# Patient Record
Sex: FEMALE
[De-identification: names, ages, dates, MRNs, and addresses within clinical notes are randomized per-mention and may not be internally consistent; named-entity substitution may affect disease eponyms.]

---

## 2020-10-29 PROBLEM — E66.01 CLASS 3 SEVERE OBESITY DUE TO EXCESS CALORIES WITHOUT SERIOUS COMORBIDITY WITH BODY MASS INDEX (BMI) OF 50.0 TO 59.9 IN ADULT (HCC): Status: ACTIVE | Noted: 2020-10-29

## 2020-10-29 PROBLEM — E66.813 CLASS 3 SEVERE OBESITY DUE TO EXCESS CALORIES WITHOUT SERIOUS COMORBIDITY WITH BODY MASS INDEX (BMI) OF 50.0 TO 59.9 IN ADULT (HCC): Status: ACTIVE | Noted: 2020-10-29

## 2020-11-09 ENCOUNTER — TELEPHONE (OUTPATIENT)
Dept: SCHEDULING | Age: 50
End: 2020-11-09

## 2020-12-07 ENCOUNTER — OFFICE VISIT (OUTPATIENT)
Dept: ENDOCRINOLOGY | Age: 50
End: 2020-12-07

## 2020-12-07 VITALS
RESPIRATION RATE: 20 BRPM | DIASTOLIC BLOOD PRESSURE: 88 MMHG | HEIGHT: 63 IN | WEIGHT: 293 LBS | BODY MASS INDEX: 51.91 KG/M2 | SYSTOLIC BLOOD PRESSURE: 136 MMHG | TEMPERATURE: 97.8 F | HEART RATE: 86 BPM

## 2020-12-07 DIAGNOSIS — E55.9 VITAMIN D DEFICIENCY: ICD-10-CM

## 2020-12-07 DIAGNOSIS — E11.69 HYPERLIPIDEMIA ASSOCIATED WITH TYPE 2 DIABETES MELLITUS (CMD): ICD-10-CM

## 2020-12-07 DIAGNOSIS — E78.5 HYPERLIPIDEMIA ASSOCIATED WITH TYPE 2 DIABETES MELLITUS (CMD): ICD-10-CM

## 2020-12-07 DIAGNOSIS — E11.65 TYPE 2 DIABETES MELLITUS WITH HYPERGLYCEMIA, WITHOUT LONG-TERM CURRENT USE OF INSULIN (CMD): Primary | ICD-10-CM

## 2020-12-07 PROCEDURE — 3075F SYST BP GE 130 - 139MM HG: CPT | Performed by: INTERNAL MEDICINE

## 2020-12-07 PROCEDURE — 3079F DIAST BP 80-89 MM HG: CPT | Performed by: INTERNAL MEDICINE

## 2020-12-07 PROCEDURE — 99244 OFF/OP CNSLTJ NEW/EST MOD 40: CPT | Performed by: INTERNAL MEDICINE

## 2020-12-07 RX ORDER — VITAMIN B COMPLEX
25 TABLET ORAL 2 TIMES DAILY
COMMUNITY

## 2020-12-07 RX ORDER — METFORMIN HYDROCHLORIDE 500 MG/1
500 TABLET, EXTENDED RELEASE ORAL 2 TIMES DAILY WITH MEALS
Qty: 60 TABLET | Refills: 3 | Status: SHIPPED | OUTPATIENT
Start: 2020-12-07

## 2020-12-07 RX ORDER — GLIMEPIRIDE 2 MG/1
2 TABLET ORAL
Qty: 30 TABLET | Refills: 3 | Status: SHIPPED | OUTPATIENT
Start: 2020-12-07 | End: 2021-03-31

## 2020-12-07 RX ORDER — ATORVASTATIN CALCIUM 20 MG/1
20 TABLET, FILM COATED ORAL DAILY
Qty: 30 TABLET | Refills: 3 | Status: SHIPPED | OUTPATIENT
Start: 2020-12-07 | End: 2021-03-31

## 2020-12-07 ASSESSMENT — PATIENT HEALTH QUESTIONNAIRE - PHQ9
CLINICAL INTERPRETATION OF PHQ2 SCORE: NO FURTHER SCREENING NEEDED
2. FEELING DOWN, DEPRESSED OR HOPELESS: NOT AT ALL
CLINICAL INTERPRETATION OF PHQ9 SCORE: NO FURTHER SCREENING NEEDED
SUM OF ALL RESPONSES TO PHQ9 QUESTIONS 1 AND 2: 0
SUM OF ALL RESPONSES TO PHQ9 QUESTIONS 1 AND 2: 0
1. LITTLE INTEREST OR PLEASURE IN DOING THINGS: NOT AT ALL

## 2020-12-10 ENCOUNTER — TELEPHONE (OUTPATIENT)
Dept: ENDOCRINOLOGY | Age: 50
End: 2020-12-10

## 2021-01-04 ENCOUNTER — APPOINTMENT (OUTPATIENT)
Dept: ENDOCRINOLOGY | Age: 51
End: 2021-01-04

## 2021-01-07 ENCOUNTER — OFFICE VISIT (OUTPATIENT)
Dept: EDUCATION SERVICES | Age: 51
End: 2021-01-07

## 2021-01-07 DIAGNOSIS — E11.65 TYPE 2 DIABETES MELLITUS WITH HYPERGLYCEMIA, WITHOUT LONG-TERM CURRENT USE OF INSULIN (CMD): Primary | ICD-10-CM

## 2021-01-07 PROCEDURE — 97802 MEDICAL NUTRITION INDIV IN: CPT | Performed by: DIETITIAN, REGISTERED

## 2021-01-14 ENCOUNTER — TELEPHONE (OUTPATIENT)
Dept: SCHEDULING | Age: 51
End: 2021-01-14

## 2021-01-14 ENCOUNTER — V-VISIT (OUTPATIENT)
Dept: ENDOCRINOLOGY | Age: 51
End: 2021-01-14

## 2021-01-14 PROCEDURE — X1094 NO CHARGE VISIT: HCPCS | Performed by: INTERNAL MEDICINE

## 2021-01-14 RX ORDER — PERPHENAZINE 16 MG/1
TABLET, FILM COATED ORAL
COMMUNITY
Start: 2020-12-26

## 2021-01-14 RX ORDER — LANCETS
EACH MISCELLANEOUS
COMMUNITY
Start: 2020-12-07

## 2021-01-29 ENCOUNTER — TELEPHONE (OUTPATIENT)
Dept: FAMILY MEDICINE | Age: 51
End: 2021-01-29

## 2021-01-30 ENCOUNTER — TELEPHONE (OUTPATIENT)
Dept: SCHEDULING | Age: 51
End: 2021-01-30

## 2021-02-01 ENCOUNTER — TELEPHONE (OUTPATIENT)
Dept: SCHEDULING | Age: 51
End: 2021-02-01

## 2021-03-15 ENCOUNTER — APPOINTMENT (OUTPATIENT)
Dept: ENDOCRINOLOGY | Age: 51
End: 2021-03-15

## 2021-03-31 DIAGNOSIS — E78.5 HYPERLIPIDEMIA ASSOCIATED WITH TYPE 2 DIABETES MELLITUS (CMD): ICD-10-CM

## 2021-03-31 DIAGNOSIS — E11.65 TYPE 2 DIABETES MELLITUS WITH HYPERGLYCEMIA, WITHOUT LONG-TERM CURRENT USE OF INSULIN (CMD): ICD-10-CM

## 2021-03-31 DIAGNOSIS — E11.69 HYPERLIPIDEMIA ASSOCIATED WITH TYPE 2 DIABETES MELLITUS (CMD): ICD-10-CM

## 2021-03-31 RX ORDER — ATORVASTATIN CALCIUM 20 MG/1
20 TABLET, FILM COATED ORAL DAILY
Qty: 30 TABLET | Refills: 3 | Status: SHIPPED | OUTPATIENT
Start: 2021-03-31 | End: 2021-08-04 | Stop reason: SDUPTHER

## 2021-03-31 RX ORDER — GLIMEPIRIDE 2 MG/1
TABLET ORAL
Qty: 30 TABLET | Refills: 3 | Status: SHIPPED | OUTPATIENT
Start: 2021-03-31 | End: 2021-08-04 | Stop reason: SDUPTHER

## 2021-05-31 DIAGNOSIS — E11.69 HYPERLIPIDEMIA ASSOCIATED WITH TYPE 2 DIABETES MELLITUS (CMD): ICD-10-CM

## 2021-05-31 DIAGNOSIS — E78.5 HYPERLIPIDEMIA ASSOCIATED WITH TYPE 2 DIABETES MELLITUS (CMD): ICD-10-CM

## 2021-06-01 RX ORDER — ATORVASTATIN CALCIUM 20 MG/1
20 TABLET, FILM COATED ORAL DAILY
Qty: 90 TABLET | Refills: 0 | OUTPATIENT
Start: 2021-06-01

## 2021-08-04 DIAGNOSIS — E78.5 HYPERLIPIDEMIA ASSOCIATED WITH TYPE 2 DIABETES MELLITUS (CMD): ICD-10-CM

## 2021-08-04 DIAGNOSIS — E11.69 HYPERLIPIDEMIA ASSOCIATED WITH TYPE 2 DIABETES MELLITUS (CMD): ICD-10-CM

## 2021-08-04 DIAGNOSIS — E11.65 TYPE 2 DIABETES MELLITUS WITH HYPERGLYCEMIA, WITHOUT LONG-TERM CURRENT USE OF INSULIN (CMD): ICD-10-CM

## 2021-08-04 RX ORDER — ATORVASTATIN CALCIUM 20 MG/1
20 TABLET, FILM COATED ORAL DAILY
Qty: 30 TABLET | Refills: 3 | Status: SHIPPED | OUTPATIENT
Start: 2021-08-04

## 2021-08-04 RX ORDER — GLIMEPIRIDE 2 MG/1
2 TABLET ORAL
Qty: 30 TABLET | Refills: 3 | Status: SHIPPED | OUTPATIENT
Start: 2021-08-04

## 2021-09-15 PROBLEM — E11.65 TYPE 2 DIABETES MELLITUS WITH HYPERGLYCEMIA, WITHOUT LONG-TERM CURRENT USE OF INSULIN (HCC): Status: ACTIVE | Noted: 2020-12-07

## 2021-09-15 PROBLEM — E78.5 HYPERLIPIDEMIA ASSOCIATED WITH TYPE 2 DIABETES MELLITUS (HCC): Status: ACTIVE | Noted: 2020-12-07

## 2021-09-15 PROBLEM — E55.9 VITAMIN D DEFICIENCY: Status: ACTIVE | Noted: 2020-12-07

## 2021-09-15 PROBLEM — E11.69 HYPERLIPIDEMIA ASSOCIATED WITH TYPE 2 DIABETES MELLITUS (HCC): Status: ACTIVE | Noted: 2020-12-07

## 2021-09-17 PROBLEM — Z12.11 SCREENING FOR MALIGNANT NEOPLASM OF COLON: Status: ACTIVE | Noted: 2021-09-17

## 2021-09-17 PROBLEM — Z12.31 VISIT FOR SCREENING MAMMOGRAM: Status: ACTIVE | Noted: 2021-09-17

## 2022-01-24 PROBLEM — E11.9 DIABETES MELLITUS TYPE 2 WITHOUT RETINOPATHY (HCC): Status: ACTIVE | Noted: 2022-01-24

## 2022-01-24 PROBLEM — H52.4 MYOPIA OF BOTH EYES WITH ASTIGMATISM AND PRESBYOPIA: Status: ACTIVE | Noted: 2022-01-24

## 2022-01-24 PROBLEM — H52.203 MYOPIA OF BOTH EYES WITH ASTIGMATISM AND PRESBYOPIA: Status: ACTIVE | Noted: 2022-01-24

## 2022-01-24 PROBLEM — H52.13 MYOPIA OF BOTH EYES WITH ASTIGMATISM AND PRESBYOPIA: Status: ACTIVE | Noted: 2022-01-24

## 2024-12-16 PROBLEM — R92.8 ABNORMAL MAMMOGRAM OF LEFT BREAST: Status: ACTIVE | Noted: 2023-07-07

## 2024-12-16 PROBLEM — D25.9 UTERINE LEIOMYOMA: Status: ACTIVE | Noted: 2022-10-21

## 2024-12-16 PROBLEM — Z91.199 NONCOMPLIANCE: Status: ACTIVE | Noted: 2022-10-21

## 2024-12-16 PROBLEM — N83.201 CYST OF RIGHT OVARY: Status: ACTIVE | Noted: 2022-10-21

## 2024-12-16 RX ORDER — ORAL SEMAGLUTIDE 14 MG/1
7 TABLET ORAL DAILY
COMMUNITY
Start: 2024-09-20 | End: 2024-12-19

## 2024-12-16 RX ORDER — ALBUTEROL SULFATE 90 UG/1
2 INHALANT RESPIRATORY (INHALATION)
COMMUNITY
Start: 2024-07-21

## 2024-12-20 ENCOUNTER — HOSPITAL ENCOUNTER (OUTPATIENT)
Facility: HOSPITAL | Age: 54
Setting detail: HOSPITAL OUTPATIENT SURGERY
Discharge: HOME | End: 2024-12-20
Attending: INTERNAL MEDICINE | Admitting: INTERNAL MEDICINE
Payer: COMMERCIAL

## 2024-12-20 ENCOUNTER — ANESTHESIA (OUTPATIENT)
Dept: ENDOSCOPY | Facility: HOSPITAL | Age: 54
End: 2024-12-20
Payer: COMMERCIAL

## 2024-12-20 ENCOUNTER — ANESTHESIA EVENT (OUTPATIENT)
Dept: ENDOSCOPY | Facility: HOSPITAL | Age: 54
End: 2024-12-20
Payer: COMMERCIAL

## 2024-12-20 VITALS
HEIGHT: 63 IN | DIASTOLIC BLOOD PRESSURE: 75 MMHG | RESPIRATION RATE: 12 BRPM | WEIGHT: 293 LBS | HEART RATE: 83 BPM | BODY MASS INDEX: 51.91 KG/M2 | TEMPERATURE: 97 F | SYSTOLIC BLOOD PRESSURE: 125 MMHG | OXYGEN SATURATION: 97 %

## 2024-12-20 DIAGNOSIS — Z12.11 COLON CANCER SCREENING: ICD-10-CM

## 2024-12-20 LAB — GLUCOSE BLDC GLUCOMTR-MCNC: 115 MG/DL (ref 70–99)

## 2024-12-20 PROCEDURE — 88305 TISSUE EXAM BY PATHOLOGIST: CPT | Performed by: INTERNAL MEDICINE

## 2024-12-20 PROCEDURE — 82962 GLUCOSE BLOOD TEST: CPT

## 2024-12-20 PROCEDURE — 0DBK8ZX EXCISION OF ASCENDING COLON, VIA NATURAL OR ARTIFICIAL OPENING ENDOSCOPIC, DIAGNOSTIC: ICD-10-PCS | Performed by: INTERNAL MEDICINE

## 2024-12-20 RX ORDER — SODIUM CHLORIDE, SODIUM LACTATE, POTASSIUM CHLORIDE, CALCIUM CHLORIDE 600; 310; 30; 20 MG/100ML; MG/100ML; MG/100ML; MG/100ML
INJECTION, SOLUTION INTRAVENOUS CONTINUOUS
Status: DISCONTINUED | OUTPATIENT
Start: 2024-12-20 | End: 2024-12-20

## 2024-12-20 RX ORDER — LIDOCAINE HYDROCHLORIDE 10 MG/ML
INJECTION, SOLUTION EPIDURAL; INFILTRATION; INTRACAUDAL; PERINEURAL AS NEEDED
Status: DISCONTINUED | OUTPATIENT
Start: 2024-12-20 | End: 2024-12-20 | Stop reason: SURG

## 2024-12-20 RX ADMIN — LIDOCAINE HYDROCHLORIDE 50 MG: 10 INJECTION, SOLUTION EPIDURAL; INFILTRATION; INTRACAUDAL; PERINEURAL at 10:38:00

## 2024-12-20 RX ADMIN — SODIUM CHLORIDE, SODIUM LACTATE, POTASSIUM CHLORIDE, CALCIUM CHLORIDE: 600; 310; 30; 20 INJECTION, SOLUTION INTRAVENOUS at 10:34:00

## 2024-12-20 NOTE — DISCHARGE INSTRUCTIONS
POST-COLONOSCOPY DISCHARGE INSTRUCTIONS    PROCEDURE PERFORMED: Colonoscopy with cold forceps biopsy    ENDOSCOPIST: Ellie Díaz MD    FINDINGS: Colon polyp(s) and Internal hemorrhoids    MEDICATIONS: You may resume all other medications today    DIET: You may resume your regular diet today.    BIOPSIES: Biopsies were taken.  They will be sent to pathology and results will be available in 7-10 days.    X-RAYS: No X-rays/Labs were ordered today        Activity for remainder of today:  REST TODAY  DO NOT drive or operate heavy machinery  DO NOT drink any alcoholic beverages  DO NOT sign any legal documents or make any important decisions    After your procedure(s):  It is not unusual to feel bloated or gassy .  Passing gas and belching is encouraged. Lying on your left side with your knees flexed may relieve the discomfort. A hot pack to the abdomen may also help. If you had an upper endoscopy (EGD), you may experience a slight sore throat which will subside. Throat lozenges or salt water gargle can be used.    FOLLOW-UP:  Contact the office at [phone] if a follow-up appointment is needed or if you develop any of the following:    Severe abdominal pain/discomfort   Excessive bleeding  Black tarry stool  Difficulty breathing/swallowing  Persistent nausea/vomiting    Fever above 100 degrees or chills      Home Care Instructions for Colonoscopy  with Sedation    Diet:  - Resume your regular diet as tolerated unless otherwise instructed.  - Start with light meals to minimize bloating.  - Do not drink alcohol today.    Medication:  - If you have questions about resuming your normal medications, please contact your Primary Care Physician.    Activities:  - Take it easy today. Do not return to work today.  - Do not drive today.  - Do not operate any machinery today (including kitchen equipment).    Colonoscopy:  - You may notice some rectal \"spotting\" (a little blood on the toilet tissue) for a day or two after the exam.  This is normal.  - If you experience any rectal bleeding (not spotting), persistent tenderness or sharp severe abdominal pains, oral temperature over 100 degrees Fahrenheit, light-headedness or dizziness, or any other problems, contact your doctor.  **If unable to reach your doctor, please go to the [facility] Emergency Room**    - Your referring physician will receive a full report of your examination.  - If you do not hear from your doctor's office within two weeks of your biopsy, please call them for your results.    You may be able to see your laboratory results in [employer] between 4 and 7 business days.  In some cases, your physician may not have viewed the results before they are released to [employer].  If you have questions regarding your results contact the physician who ordered the test/exam by phone or via [employer] by choosing \"Ask a Medical Question.\"

## 2024-12-20 NOTE — H&P
HISTORY AND PHYSICAL FOR ENDOSCOPIC PROCEDURES      Syl Mariscal Patient Status:  Hospital Outpatient Surgery    1970 MRN [id number]   Location [facility] ENDOSCOPY LAB SUITES Attending Ellie Díaz MD   Hosp Day # 0 PCP No primary care provider on file.     Date of Initial Consult:  today  Reason for Consultation: screening    Scheduled Procedure: Colonoscopy  Indications for Procedure: Screening    History of Present Illness:  Syl Mariscal is a/a(n) 54 year old female who presented with complaints as stated above.    Medical History:  Past Medical History:    Calculus of kidney    Diabetes (HCC)    High blood pressure    High cholesterol     PtHistory reviewed. No pertinent surgical history.   reports that she has never smoked. She has never used smokeless tobacco. She reports current alcohol use. She reports that she does not use drugs.  Family History   Problem Relation Age of Onset    Diabetes Mother     Renal Disease Mother     Diabetes Sister        Allergies:  Allergies[1]    Medications:    Current Facility-Administered Medications:     lactated ringers infusion, , Intravenous, Continuous    Anticoagulants: None    History of Anesthesia Complications: None    Review of Systems:  General: Negative other than what was specified in the HPI  Respiratory: Negative other than what was specified in the HPI  Cardiovascular: Negative other than what was specified in the HPI  Gastrointestinal: Negative other than what was specified in the HPI  Neurological: Negative other than what was specified in the HPI    Physical Exam:  General: AAOx3 in NAD  HEENT: No scleral icterus, no LAD  Lungs: CTA bilaterally, no wheezing or crackles  CV: RRR S1S2, no murmurs or rubs  Abdomen: Normal active bowel sounds, soft, nontender, nondistended, no rebound or guarding  Extremities: No edema or discoloration    Assessment and Plan:  Patient Active Problem List   Diagnosis    Class 3 severe obesity due to excess  calories without serious comorbidity with body mass index (BMI) of 50.0 to 59.9 in adult (HCC)    Type 2 diabetes mellitus with hyperglycemia, without long-term current use of insulin (HCC)    Vitamin D deficiency    Hyperlipidemia associated with type 2 diabetes mellitus (HCC)    Screening for malignant neoplasm of colon    Visit for screening mammogram    Diabetes mellitus type 2 without retinopathy (HCC)    Myopia of both eyes with astigmatism and presbyopia    Abnormal mammogram of left breast    Cyst of right ovary    Noncompliance    Uterine leiomyoma         A. ASA Classification: 3. Patient with severe systemic disease    B.  The sedation plan was explained to the patient.  The risks, benefits and alternatives to the aforementioned endoscopic evaluation(s), including but not limited to: infection, bleeding, aspiration, perforation, adverse medications reaction, missed diagnosis and missed lesions, were explained to the patient and/or family and/or POA and they voiced their understanding and agreed to undergo the procedure(s). They were given the opportunity to ask questions and all inquiries were addressed.    C.  Sedation Plan: Monitored Anesthesia Care      Ellie Díaz MD  12/20/2024  10:32 AM         [1] No Known Allergies

## 2024-12-20 NOTE — ANESTHESIA POSTPROCEDURE EVALUATION
Patient: Syl Mariscal    Procedure Summary       Date: 12/20/24 Room / Location: [facility] ENDOSCOPY 03 / EM ENDOSCOPY    Anesthesia Start: 1034 Anesthesia Stop: 1103    Procedure: COLONOSCOPY Diagnosis:       Colon cancer screening      (polyps,hemorrhoids)    Surgeons: Ellie Díaz MD Anesthesiologist: Desirae Doan CRNA    Anesthesia Type: MAC ASA Status: 3            Anesthesia Type: MAC    Vitals Value Taken Time   /77 12/20/24 1103   Temp 97 °F (36.1 °C) 12/20/24 1103   Pulse 72 12/20/24 1103   Resp 18 12/20/24 1103   SpO2 99 % 12/20/24 1103       [facility] AN Post Evaluation:   Patient Evaluated in PACU  Patient Participation: complete - patient participated  Level of Consciousness: awake and alert  Pain Score: 0  Pain Management: adequate  Airway Patency:patent  Yes    Nausea/Vomiting: none  Cardiovascular Status: acceptable  Respiratory Status: acceptable  Postoperative Hydration acceptable      Desirae Doan CRNA  12/20/2024 11:03 AM

## 2024-12-20 NOTE — ANESTHESIA PREPROCEDURE EVALUATION
Anesthesia PreOp Note    HPI:     Syl Mariscal is a 54 year old female who presents for preoperative consultation requested by: Ellie Díaz MD    Date of Surgery: 12/20/2024    Procedure(s):  COLONOSCOPY  Indication: Colon cancer screening    Relevant Problems   No relevant active problems       NPO:  Last Liquid Consumption Date: 12/20/24  Last Liquid Consumption Time: 0450  Last Solid Consumption Date: 12/18/24  Last Solid Consumption Time: 1230  Last Liquid Consumption Date: 12/20/24          History Review:  Patient Active Problem List    Diagnosis Date Noted    Abnormal mammogram of left breast 07/07/2023    Cyst of right ovary 10/21/2022    Noncompliance 10/21/2022    Uterine leiomyoma 10/21/2022    Diabetes mellitus type 2 without retinopathy (HCC) 01/24/2022    Myopia of both eyes with astigmatism and presbyopia 01/24/2022    Screening for malignant neoplasm of colon 09/17/2021    Visit for screening mammogram 09/17/2021    Type 2 diabetes mellitus with hyperglycemia, without long-term current use of insulin ([facility]) 12/07/2020    Vitamin D deficiency 12/07/2020    Hyperlipidemia associated with type 2 diabetes mellitus ([facility]) 12/07/2020    Class 3 severe obesity due to excess calories without serious comorbidity with body mass index (BMI) of 50.0 to 59.9 in adult ([facility]) 10/29/2020       Past Medical History:    Calculus of kidney    Diabetes ([facility])    High blood pressure    High cholesterol       History reviewed. No pertinent surgical history.    Prescriptions Prior to Admission[1]  Current Medications and Prescriptions Ordered in Epic[2]    Allergies[3]    Family History   Problem Relation Age of Onset    Diabetes Mother     Renal Disease Mother     Diabetes Sister      Social History     Socioeconomic History    Marital status: Unknown   Tobacco Use    Smoking status: Never    Smokeless tobacco: Never   Vaping Use    Vaping status: Never Used   Substance and Sexual Activity    Alcohol use: Yes      Comment: SOCIALLY    Drug use: Never       Available pre-op labs reviewed.     Lab Results   Component Value Date    PGLU 115 (H) 2024          Vital Signs:  Body mass index is 54.38 kg/m².   height is 1.6 m (5' 3\") and weight is 139.3 kg (307 lb) (abnormal). Her blood pressure is 118/87 and her pulse is 98. Her respiration is 20 and oxygen saturation is 96%.   Vitals:    24 1154 24 0950   BP:  118/87   Pulse:  98   Resp:  20   SpO2:  96%   Weight: (!) 139.3 kg (307 lb)    Height: 1.6 m (5' 3\")         Anesthesia Evaluation      Airway   Mallampati: II  TM distance: >3 FB  Neck ROM: full  Dental      Pulmonary     breath sounds clear to auscultation  Cardiovascular   (+) hypertension    Rhythm: regular  Rate: normal    Neuro/Psych      GI/Hepatic/Renal      Endo/Other    (+) diabetes mellitus  Abdominal   (+) obese    Abdomen: soft.                 Anesthesia Plan:   ASA:  3  Plan:   MAC      I have informed Syl Mariscal and/or legal guardian or family member of the nature of the anesthetic plan, benefits, risks including possible dental damage if relevant, major complications, and any alternative forms of anesthetic management.   All of the patient's questions were answered to the best of my ability. The patient desires the anesthetic management as planned.  Desirae Doan CRNA  2024 10:33 AM  Present on Admission:  **None**           [1]   Medications Prior to Admission   Medication Sig Dispense Refill Last Dose/Taking    [] Semaglutide (RYBELSUS) 14 MG Oral Tab Take 7 mg by mouth daily.   2024    atorvastatin 20 MG Oral Tab Take 1 tablet (20 mg total) by mouth nightly. 90 tablet 3 2024    metFORMIN HCl  MG Oral Tablet 24 Hr Take 2 tablets (1,000 mg total) by mouth 2 (two) times daily with meals. 360 tablet 1 2024    lisinopril 5 MG Oral Tab Take 1 tablet (5 mg total) by mouth every morning. 90 tablet 3 2024    albuterol 108 (90 Base) MCG/ACT  Inhalation Aero Soln Inhale 2 puffs into the lungs. (Patient not taking: Reported on 12/16/2024)   Not Taking    glimepiride 2 MG Oral Tab Take 1 tablet (2 mg total) by mouth every morning before breakfast. (Patient not taking: Reported on 12/16/2024) 90 tablet 3 Not Taking    Blood Glucose Monitoring Suppl w/Device Does not apply Kit Please provide patient with a glucometer, strips, and lancets to test BID for DM E11.65       Glucose Blood (CONTOUR NEXT TEST) In Vitro Strip USE TO TEST BLOOD SUGAR TWICE DAILY       Microlet Lancets Does not apply [facility] [place] TEST BS BID      [2]   Current Facility-Administered Medications Ordered in Epic   Medication Dose Route Frequency Provider Last Rate Last Admin    lactated ringers infusion   Intravenous Continuous Ellie Díaz MD         No current [facility]-ordered outpatient medications on file.   [3] No Known Allergies

## 2024-12-20 NOTE — OPERATIVE REPORT
Colonoscopy Operative Report    Syl Mariscal Patient Status:  [facility] Outpatient Surgery    1970 MRN [id number]   Location [facility] ENDOSCOPY LAB SUITES Attending Ellie Díaz MD    Day #   0 PCP No primary care provider on file.     Pre-Operative Diagnosis: Colon cancer screening    Date of previous colonoscopy: none    Post-Operative Diagnosis:   Colon polyps and Internal Hemorrhoids     Procedure Performed:   Colonoscopy with cold forceps biopsy      Pre-procedure: The patient was assessed in the procedure room immediately before induction of sedation which included, at a minimum, vital signs, NPO status, and airway assessment.  The patient was deemed and appropriate candidate for procedural sedation.    Informed Consent: Informed consent for both procedures and sedation were obtained from the patient.  The risks, benefits and alternatives to the procedures including potentially life-threatening complications of sedation, bleeding, perforation, missed lesions or need for repeat endoscopy were reviewed along with the possible need for hospitalization, surgical management, transfusion of blood products or repeat endoscopy should one of these complications arise.  The patient and/or POA voiced their understanding and was agreeable to proceed.     Sedation Type: MAC-Patient received sedation with monitored anesthesia provided by an anesthesiologist    Colonoscopy Procedure Description: The patient was placed in the left lateral decubitus position.  After careful digital rectal examination, the Adult colonoscope was inserted into the rectum under direct vision and advanced to the level of the cecum under direct visualization. The cecum was identified by landmarks, including the appendiceal orifice and ileoceccal valve. Careful examination of the entire colon was performed during withdrawal of the endoscope. The scope was withdrawn to the rectum  and retroflexion was performed.  The colonoscopy was performed without difficulty and the patient tolerated the procedure well with no immediate complications. The patient was transferred to the recovery area in stable condition.     Quality of Preparation/Aronchick Bowel Prep Scale: 2: Good (Clear liquid covering 5%-25% of mucosa, but >90% of mucosa seen)  Cecum Withdrawal Time: 9 min    Findings: A 3 mm sessile polyp was found in the proximal ascending colon and removed completely with cold forceps pathology.  Small internal hemorrhoids were found on retroflexion.  Remainder of the exam was otherwise unremarkable.    Impression:   Colon polyp(s) and Internal hemorrhoids    Recommendations: Discharge patient when discharge criteria are met., Await pathology results, [facility] or await call from office. If you do not receive your results within a week, please call the office., and Depending on pathology results, surveillance interval for next colonoscopy will be determined.    Discharge:  The patient was given an after visit summary detailing the procedure, findings, recommendations and follow up plans.     Ellie Díaz MD  12/20/2024  11:00 AM

## (undated) DEVICE — GIJAW SINGLE-USE BIOPSY FORCEPS WITH NEEDLE: Brand: GIJAW

## (undated) DEVICE — 60 ML SYRINGE REGULAR TIP: Brand: MONOJECT

## (undated) DEVICE — CO2 CANNULA,SSOFT,ADLT,7O2,4CO2,FEMALE: Brand: MEDLINE

## (undated) DEVICE — MEDI-VAC NON-CONDUCTIVE SUCTION TUBING 6MM X 1.8M (6FT.) L: Brand: CARDINAL HEALTH

## (undated) DEVICE — KIT ENDO ORCAPOD 160/180/190

## (undated) DEVICE — KIT CLEAN ENDOKIT 1.1OZ GOWNX2

## (undated) DEVICE — V2 SPECIMEN COLLECTION MANIFOLD KIT: Brand: NEPTUNE

## (undated) NOTE — LETTER
[facility]  Administration of Anesthesia  Syl SCOTT agree to be cared for by a physician anesthesiologist alone and/or with a nurse anesthetist, who is specially trained to monitor me and give me medicine to put me to sleep or keep me comfortable during my procedure    I understand that my anesthesiologist and/or anesthetist is not an employee or agent of [facility] or [employer]. He or she works for [facility]    As the patient asking for anesthesia services, I agree to:  Allow the anesthesiologist (anesthesia doctor) to give me medicine and do additional procedures as necessary. Some examples are: Starting or using an “IV” to give me medicine, fluids or blood during my procedure, and having a breathing tube placed to help me breathe when I’m asleep (intubation). In the event that my heart stops working properly, I understand that my anesthesiologist will make every effort to sustain my life, unless otherwise directed by [facility] Do Not Resuscitate documents.  Tell my anesthesia doctor before my procedure:  If I am pregnant.  The last time that I ate or drank.  iii. All of the medicines I take (including prescriptions, herbal supplements, and pills I can buy without a prescription (including street drugs/illegal medications). Failure to inform my anesthesiologist about these medicines may increase my risk of anesthetic complications.  iv.If I am allergic to anything or have had a reaction to anesthesia before.  I understand how the anesthesia medicine will help me (benefits).  I understand that with any type of anesthesia medicine there are risks:  The most common risks are: nausea, vomiting, sore throat, muscle soreness, damage to my eyes, mouth, or teeth (from breathing tube placement).  Rare risks include: remembering what happened during my procedure, allergic reactions to medications, injury to my airway, heart, lungs, vision, nerves, or  muscles and in extremely rare instances death.  My doctor has explained to me other choices available to me for my care (alternatives).  Pregnant Patients (“epidural”):  I understand that the risks of having an epidural (medicine given into my back to help control pain during labor), include itching, low blood pressure, difficulty urinating, headache or slowing of the baby’s heart. Very rare risks include infection, bleeding, seizure, irregular heart rhythms and nerve injury.  Regional Anesthesia (“spinal”, “epidural”, & “nerve blocks”):  I understand that rare but potential complications include headache, bleeding, infection, seizure, irregular heart rhythms, and nerve injury.    _____________________________________________________________________________  Patient (or Representative) Signature/Relationship to Patient  Date   Time    _____________________________________________________________________________   Name (if used)    Language/Organization   Time    _____________________________________________________________________________  Nurse Anesthetist Signature     Date   Time  _____________________________________________________________________________  Anesthesiologist Signature     Date   Time  I have discussed the procedure and information above with the patient (or patient’s representative) and answered their questions. The patient or their representative has agreed to have anesthesia services.    _____________________________________________________________________________  Witness        Date   Time  I have verified that the signature is that of the patient or patient’s representative, and that it was signed before the procedure  Patient Name: Syl Mariscal     : 1970                 Printed: 2024 at 2:40 PM    Medical Record #: [id number]                                            Page 1 of 1  ----------ANESTHESIA CONSENT----------

## (undated) NOTE — LETTER
[facility]  [address], [place], [place]    Authorization for Surgical Operation and Procedure                               I hereby authorize Ellie Díaz MD, my physician and his/her assistants (if applicable), which may include medical students, residents, and/or fellows, to perform the following surgical operation/ procedure and administer such anesthesia as may be determined necessary by my physician: Operation/Procedure name (s) COLONOSCOPY on Syl Mariscal   2.   I recognize that during the surgical operation/procedure, unforeseen conditions may necessitate additional or different procedures than those listed above.  I, therefore, further authorize and request that the above-named surgeon, assistants, or designees perform such procedures as are, in their judgment, necessary and desirable.    3.   My surgeon/physician has discussed prior to my surgery the potential benefits, risks and side effects of this procedure; the likelihood of achieving goals; and potential problems that might occur during recuperation.  They also discussed reasonable alternatives to the procedure, including risks, benefits, and side effects related to the alternatives and risks related to not receiving this procedure.  I have had all my questions answered and I acknowledge that no guarantee has been made as to the result that may be obtained.    4.   Should the need arise during my operation/procedure, which includes change of level of care prior to discharge, I also consent to the administration of blood and/or blood products.  Further, I understand that despite careful testing and screening of blood or blood products by collecting agencies, I may still be subject to ill effects as a result of receiving a blood transfusion and/or blood products.  The following are some, but not all, of the potential risks that can occur: fever and allergic reactions, hemolytic reactions, transmission of diseases such as  Hepatitis, AIDS and Cytomegalovirus (CMV) and fluid overload.  In the event that I wish to have an autologous transfusion of my own blood, or a directed donor transfusion, I will discuss this with my physician.  Check only if Refusing Blood or Blood Products  I understand refusal of blood or blood products as deemed necessary by my physician may have serious consequences to my condition to include possible death. I hereby assume responsibility for my refusal and release the hospital, its personnel, and my physicians from any responsibility for the consequences of my refusal.    o  Refuse   5.   I authorize the use of any specimen, organs, tissues, body parts or foreign objects that may be removed from my body during the operation/procedure for diagnosis, research or teaching purposes and their subsequent disposal by hospital authorities.  I also authorize the release of specimen test results and/or written reports to my treating physician on the hospital medical staff or other referring or consulting physicians involved in my care, at the discretion of the Pathologist or my treating physician.    6.   I consent to the photographing or videotaping of the operations or procedures to be performed, including appropriate portions of my body for medical, scientific, or educational purposes, provided my identity is not revealed by the pictures or by descriptive texts accompanying them.  If the procedure has been photographed/videotaped, the surgeon will obtain the original picture, image, videotape or CD.  The hospital will not be responsible for storage, release or maintenance of the picture, image, tape or CD.    7.   I consent to the presence of a  or observers in the operating room as deemed necessary by my physician or their designees.    8.   I recognize that in the event my procedure results in extended X-Ray/fluoroscopy time, I may develop a skin reaction.    9. If I have a Do Not Attempt  Resuscitation (DNAR) order in place, that status will be suspended while in the operating room, procedural suite, and during the recovery period unless otherwise explicitly stated by me (or a person authorized to consent on my behalf). The surgeon or my attending physician will determine when the applicable recovery period ends for purposes of reinstating the DNAR order.  10. Patients having a sterilization procedure: I understand that if the procedure is successful the results will be permanent and it will therefore be impossible for me to inseminate, conceive, or bear children.  I also understand that the procedure is intended to result in sterility, although the result has not been guaranteed.   11. I acknowledge that my physician has explained sedation/analgesia administration to me including the risk and benefits I consent to the administration of sedation/analgesia as may be necessary or desirable in the judgment of my physician.    I CERTIFY THAT I HAVE READ AND FULLY UNDERSTAND THE ABOVE CONSENT TO OPERATION and/or OTHER PROCEDURE.     ____________________________________  _________________________________        ______________________________  Signature of Patient    Signature of Responsible Person                Printed Name of Responsible Person                                      ____________________________________  _____________________________                ________________________________  Signature of Witness        Date  Time         Relationship to Patient    STATEMENT OF PHYSICIAN My signature below affirms that prior to the time of the procedure; I have explained to the patient and/or his/her legal representative, the risks and benefits involved in the proposed treatment and any reasonable alternative to the proposed treatment. I have also explained the risks and benefits involved in refusal of the proposed treatment and alternatives to the proposed treatment and have answered the patient's  questions. If I have a significant financial interest in a co-management agreement or a significant financial interest in any product or implant, or other significant relationship used in this procedure/surgery, I have disclosed this and had a discussion with my patient.     _____________________________________________________              _____________________________  (Signature of Physician)                                                                                         (Date)                                   (Time)  Patient Name: Syl Mariscal      : 1970      Printed: 2024     Medical Record #: [id number]                                      Page 1 of 1

## (undated) NOTE — LETTER
[facility]  [address], [place], [place]    Authorization for Surgical Operation and Procedure                               I hereby authorize Ellie Díaz MD, my physician and his/her assistants (if applicable), which may include medical students, residents, and/or fellows, to perform the following surgical operation/ procedure and administer such anesthesia as may be determined necessary by my physician: Operation/Procedure name (s) COLONOSCOPY on Syl Mariscal   2.   I recognize that during the surgical operation/procedure, unforeseen conditions may necessitate additional or different procedures than those listed above.  I, therefore, further authorize and request that the above-named surgeon, assistants, or designees perform such procedures as are, in their judgment, necessary and desirable.    3.   My surgeon/physician has discussed prior to my surgery the potential benefits, risks and side effects of this procedure; the likelihood of achieving goals; and potential problems that might occur during recuperation.  They also discussed reasonable alternatives to the procedure, including risks, benefits, and side effects related to the alternatives and risks related to not receiving this procedure.  I have had all my questions answered and I acknowledge that no guarantee has been made as to the result that may be obtained.    4.   Should the need arise during my operation/procedure, which includes change of level of care prior to discharge, I also consent to the administration of blood and/or blood products.  Further, I understand that despite careful testing and screening of blood or blood products by collecting agencies, I may still be subject to ill effects as a result of receiving a blood transfusion and/or blood products.  The following are some, but not all, of the potential risks that can occur: fever and allergic reactions, hemolytic reactions, transmission of diseases such as  Hepatitis, AIDS and Cytomegalovirus (CMV) and fluid overload.  In the event that I wish to have an autologous transfusion of my own blood, or a directed donor transfusion, I will discuss this with my physician.  Check only if Refusing Blood or Blood Products  I understand refusal of blood or blood products as deemed necessary by my physician may have serious consequences to my condition to include possible death. I hereby assume responsibility for my refusal and release the hospital, its personnel, and my physicians from any responsibility for the consequences of my refusal.    o  Refuse   5.   I authorize the use of any specimen, organs, tissues, body parts or foreign objects that may be removed from my body during the operation/procedure for diagnosis, research or teaching purposes and their subsequent disposal by hospital authorities.  I also authorize the release of specimen test results and/or written reports to my treating physician on the hospital medical staff or other referring or consulting physicians involved in my care, at the discretion of the Pathologist or my treating physician.    6.   I consent to the photographing or videotaping of the operations or procedures to be performed, including appropriate portions of my body for medical, scientific, or educational purposes, provided my identity is not revealed by the pictures or by descriptive texts accompanying them.  If the procedure has been photographed/videotaped, the surgeon will obtain the original picture, image, videotape or CD.  The hospital will not be responsible for storage, release or maintenance of the picture, image, tape or CD.    7.   I consent to the presence of a  or observers in the operating room as deemed necessary by my physician or their designees.    8.   I recognize that in the event my procedure results in extended X-Ray/fluoroscopy time, I may develop a skin reaction.    9. If I have a Do Not Attempt  Resuscitation (DNAR) order in place, that status will be suspended while in the operating room, procedural suite, and during the recovery period unless otherwise explicitly stated by me (or a person authorized to consent on my behalf). The surgeon or my attending physician will determine when the applicable recovery period ends for purposes of reinstating the DNAR order.  10. Patients having a sterilization procedure: I understand that if the procedure is successful the results will be permanent and it will therefore be impossible for me to inseminate, conceive, or bear children.  I also understand that the procedure is intended to result in sterility, although the result has not been guaranteed.   11. I acknowledge that my physician has explained sedation/analgesia administration to me including the risk and benefits I consent to the administration of sedation/analgesia as may be necessary or desirable in the judgment of my physician.    I CERTIFY THAT I HAVE READ AND FULLY UNDERSTAND THE ABOVE CONSENT TO OPERATION and/or OTHER PROCEDURE.     ____________________________________  _________________________________        ______________________________  Signature of Patient    Signature of Responsible Person                Printed Name of Responsible Person                                      ____________________________________  _____________________________                ________________________________  Signature of Witness        Date  Time         Relationship to Patient    STATEMENT OF PHYSICIAN My signature below affirms that prior to the time of the procedure; I have explained to the patient and/or his/her legal representative, the risks and benefits involved in the proposed treatment and any reasonable alternative to the proposed treatment. I have also explained the risks and benefits involved in refusal of the proposed treatment and alternatives to the proposed treatment and have answered the patient's  questions. If I have a significant financial interest in a co-management agreement or a significant financial interest in any product or implant, or other significant relationship used in this procedure/surgery, I have disclosed this and had a discussion with my patient.     _____________________________________________________              _____________________________  (Signature of Physician)                                                                                         (Date)                                   (Time)  Patient Name: Syl Mariscal      : 1970      Printed: 2024     Medical Record #: [id number]                                      Page 1 of 1

## (undated) NOTE — LETTER
[facility]  Administration of Anesthesia  ISyl agree to be cared for by a physician anesthesiologist alone and/or with a nurse anesthetist, who is specially trained to monitor me and give me medicine to put me to sleep or keep me comfortable during my procedure    I understand that my anesthesiologist and/or anesthetist is not an employee or agent of [facility] or [employer]. He or she works for [facility]    As the patient asking for anesthesia services, I agree to:  Allow the anesthesiologist (anesthesia doctor) to give me medicine and do additional procedures as necessary. Some examples are: Starting or using an “IV” to give me medicine, fluids or blood during my procedure, and having a breathing tube placed to help me breathe when I’m asleep (intubation). In the event that my heart stops working properly, I understand that my anesthesiologist will make every effort to sustain my life, unless otherwise directed by [facility] Do Not Resuscitate documents.  Tell my anesthesia doctor before my procedure:  If I am pregnant.  The last time that I ate or drank.  iii. All of the medicines I take (including prescriptions, herbal supplements, and pills I can buy without a prescription (including street drugs/illegal medications). Failure to inform my anesthesiologist about these medicines may increase my risk of anesthetic complications.  iv.If I am allergic to anything or have had a reaction to anesthesia before.  I understand how the anesthesia medicine will help me (benefits).  I understand that with any type of anesthesia medicine there are risks:  The most common risks are: nausea, vomiting, sore throat, muscle soreness, damage to my eyes, mouth, or teeth (from breathing tube placement).  Rare risks include: remembering what happened during my procedure, allergic reactions to medications, injury to my airway, heart, lungs, vision, nerves, or  muscles and in extremely rare instances death.  My doctor has explained to me other choices available to me for my care (alternatives).  Pregnant Patients (“epidural”):  I understand that the risks of having an epidural (medicine given into my back to help control pain during labor), include itching, low blood pressure, difficulty urinating, headache or slowing of the baby’s heart. Very rare risks include infection, bleeding, seizure, irregular heart rhythms and nerve injury.  Regional Anesthesia (“spinal”, “epidural”, & “nerve blocks”):  I understand that rare but potential complications include headache, bleeding, infection, seizure, irregular heart rhythms, and nerve injury.    _____________________________________________________________________________  Patient (or Representative) Signature/Relationship to Patient  Date   Time    _____________________________________________________________________________   Name (if used)    Language/Organization   Time    _____________________________________________________________________________  Nurse Anesthetist Signature     Date   Time  _____________________________________________________________________________  Anesthesiologist Signature     Date   Time  I have discussed the procedure and information above with the patient (or patient’s representative) and answered their questions. The patient or their representative has agreed to have anesthesia services.    _____________________________________________________________________________  Witness        Date   Time  I have verified that the signature is that of the patient or patient’s representative, and that it was signed before the procedure  Patient Name: Syl Mariscal     : 1970                 Printed: 2024 at 9:21 AM    Medical Record #: [id number]                                            Page 1 of 1  ----------ANESTHESIA CONSENT----------